# Patient Record
Sex: FEMALE | Race: WHITE | Employment: FULL TIME | URBAN - METROPOLITAN AREA
[De-identification: names, ages, dates, MRNs, and addresses within clinical notes are randomized per-mention and may not be internally consistent; named-entity substitution may affect disease eponyms.]

---

## 2017-02-14 ENCOUNTER — OFFICE VISIT (OUTPATIENT)
Dept: FAMILY MEDICINE CLINIC | Facility: CLINIC | Age: 62
End: 2017-02-14

## 2017-02-14 VITALS
TEMPERATURE: 98 F | HEART RATE: 97 BPM | DIASTOLIC BLOOD PRESSURE: 70 MMHG | OXYGEN SATURATION: 98 % | SYSTOLIC BLOOD PRESSURE: 120 MMHG | RESPIRATION RATE: 16 BRPM

## 2017-02-14 DIAGNOSIS — H10.9 CONJUNCTIVITIS OF BOTH EYES, UNSPECIFIED CONJUNCTIVITIS TYPE: Primary | ICD-10-CM

## 2017-02-14 PROCEDURE — 99213 OFFICE O/P EST LOW 20 MIN: CPT | Performed by: PHYSICIAN ASSISTANT

## 2017-02-14 RX ORDER — TOBRAMYCIN 3 MG/ML
SOLUTION/ DROPS OPHTHALMIC
Qty: 5 ML | Refills: 0 | Status: SHIPPED | OUTPATIENT
Start: 2017-02-14 | End: 2017-03-30 | Stop reason: ALTCHOICE

## 2017-02-14 NOTE — PROGRESS NOTES
CHIEF COMPLAINT:   Patient presents with:  Conjunctivitis      HPI:   Chidi López is a 64year old female who presents with chief complaint of  Bilateral eye redness.   She has had similar symptoms before and states antibiotic drops resolved the prob History   Diagnosis Date   • Esophageal reflux    • Fibromyalgia    • Osteoarthrosis, unspecified whether generalized or localized, unspecified site    • Connective tissue disease, undifferentiated ([facility]    • History of peptic ulcer    • Fatigue 8/11/2011 Temp(Src) 98 °F (36.7 °C) (Oral)  Resp 16  SpO2 98%  GENERAL: well developed, well nourished,in no apparent distress  SKIN: no rashes,no suspicious lesions  EYES: PERRLA, EOMI, bilateral conjunctiva  milldy erythematous, injected, no gross discharge noted

## 2017-04-06 NOTE — H&P
[facility]    PATIENT'S NAME: Billy Brown   ATTENDING PHYSICIAN: Thu Ashley M.D.    PATIENT ACCOUNT#:   [de-identified]    LOCATION:  OR   [facility]  MEDICAL RECORD #:   [id number]       YOB: 1955  ADMISSION DATE:       04/11/2017 She notes that she occasionally experiences some anxiety and depression. History of GERD in 2006. History of irritable bowel syndrome approximately 2006. Scoliosis diagnosed in 1974. Shingles diagnosed 04/05/2017.     GYNECOLOGIC HISTORY:  Menarche age she understands the procedure and risks of the procedure and consents to the above. Preoperative labs done. Postoperative care discussed. Cytotec discussed and prescription given.   Patient placed on acyclovir for her shingles and referred to Dermatology

## 2017-04-11 ENCOUNTER — SURGERY (OUTPATIENT)
Age: 62
End: 2017-04-11

## 2017-04-11 ENCOUNTER — ANESTHESIA (OUTPATIENT)
Dept: SURGERY | Facility: HOSPITAL | Age: 62
End: 2017-04-11
Payer: COMMERCIAL

## 2017-04-11 ENCOUNTER — ANESTHESIA EVENT (OUTPATIENT)
Dept: SURGERY | Facility: HOSPITAL | Age: 62
End: 2017-04-11
Payer: COMMERCIAL

## 2017-04-11 ENCOUNTER — HOSPITAL ENCOUNTER (OUTPATIENT)
Facility: HOSPITAL | Age: 62
Setting detail: HOSPITAL OUTPATIENT SURGERY
Discharge: HOME | End: 2017-04-11
Attending: OBSTETRICS & GYNECOLOGY | Admitting: OBSTETRICS & GYNECOLOGY
Payer: COMMERCIAL

## 2017-04-11 VITALS
TEMPERATURE: 99 F | OXYGEN SATURATION: 99 % | SYSTOLIC BLOOD PRESSURE: 111 MMHG | WEIGHT: 178.81 LBS | BODY MASS INDEX: 28.74 KG/M2 | DIASTOLIC BLOOD PRESSURE: 73 MMHG | HEART RATE: 69 BPM | RESPIRATION RATE: 20 BRPM | HEIGHT: 66 IN

## 2017-04-11 PROCEDURE — 0UDB8ZX EXTRACTION OF ENDOMETRIUM, VIA NATURAL OR ARTIFICIAL OPENING ENDOSCOPIC, DIAGNOSTIC: ICD-10-PCS | Performed by: OBSTETRICS & GYNECOLOGY

## 2017-04-11 PROCEDURE — 88305 TISSUE EXAM BY PATHOLOGIST: CPT | Performed by: OBSTETRICS & GYNECOLOGY

## 2017-04-11 RX ORDER — ACYCLOVIR 200 MG/1
200 CAPSULE ORAL
COMMUNITY
End: 2017-04-28 | Stop reason: ALTCHOICE

## 2017-04-11 RX ORDER — MIDAZOLAM HYDROCHLORIDE 1 MG/ML
1 INJECTION INTRAMUSCULAR; INTRAVENOUS EVERY 5 MIN PRN
Status: DISCONTINUED | OUTPATIENT
Start: 2017-04-11 | End: 2017-04-11

## 2017-04-11 RX ORDER — METOCLOPRAMIDE HYDROCHLORIDE 5 MG/ML
10 INJECTION INTRAMUSCULAR; INTRAVENOUS AS NEEDED
Status: DISCONTINUED | OUTPATIENT
Start: 2017-04-11 | End: 2017-04-11

## 2017-04-11 RX ORDER — SODIUM CHLORIDE, SODIUM LACTATE, POTASSIUM CHLORIDE, CALCIUM CHLORIDE 600; 310; 30; 20 MG/100ML; MG/100ML; MG/100ML; MG/100ML
INJECTION, SOLUTION INTRAVENOUS CONTINUOUS
Status: DISCONTINUED | OUTPATIENT
Start: 2017-04-11 | End: 2017-04-11

## 2017-04-11 RX ORDER — CLINDAMYCIN PHOSPHATE 900 MG/50ML
900 INJECTION INTRAVENOUS ONCE
Status: DISCONTINUED | OUTPATIENT
Start: 2017-04-11 | End: 2017-04-11 | Stop reason: HOSPADM

## 2017-04-11 RX ORDER — HYDROMORPHONE HYDROCHLORIDE 1 MG/ML
0.4 INJECTION, SOLUTION INTRAMUSCULAR; INTRAVENOUS; SUBCUTANEOUS EVERY 5 MIN PRN
Status: DISCONTINUED | OUTPATIENT
Start: 2017-04-11 | End: 2017-04-11

## 2017-04-11 RX ORDER — ONDANSETRON 2 MG/ML
4 INJECTION INTRAMUSCULAR; INTRAVENOUS AS NEEDED
Status: DISCONTINUED | OUTPATIENT
Start: 2017-04-11 | End: 2017-04-11

## 2017-04-11 RX ORDER — MEPERIDINE HYDROCHLORIDE 25 MG/ML
12.5 INJECTION INTRAMUSCULAR; INTRAVENOUS; SUBCUTANEOUS AS NEEDED
Status: DISCONTINUED | OUTPATIENT
Start: 2017-04-11 | End: 2017-04-11

## 2017-04-11 RX ORDER — NALOXONE HYDROCHLORIDE 0.4 MG/ML
80 INJECTION, SOLUTION INTRAMUSCULAR; INTRAVENOUS; SUBCUTANEOUS AS NEEDED
Status: DISCONTINUED | OUTPATIENT
Start: 2017-04-11 | End: 2017-04-11

## 2017-04-11 RX ORDER — CLINDAMYCIN PHOSPHATE 900 MG/50ML
INJECTION INTRAVENOUS
Status: DISCONTINUED | OUTPATIENT
Start: 2017-04-11 | End: 2017-04-11

## 2017-04-11 RX ORDER — ACETAMINOPHEN 500 MG
1000 TABLET ORAL ONCE AS NEEDED
Status: DISCONTINUED | OUTPATIENT
Start: 2017-04-11 | End: 2017-04-11

## 2017-04-11 RX ORDER — LIDOCAINE HYDROCHLORIDE 10 MG/ML
INJECTION, SOLUTION INFILTRATION; PERINEURAL AS NEEDED
Status: DISCONTINUED | OUTPATIENT
Start: 2017-04-11 | End: 2017-04-11

## 2017-04-11 RX ORDER — GENTAMICIN SULFATE 60 MG/50ML
INJECTION, SOLUTION INTRAVENOUS
Status: DISCONTINUED | OUTPATIENT
Start: 2017-04-11 | End: 2017-04-11

## 2017-04-11 RX ORDER — HYDROCODONE BITARTRATE AND ACETAMINOPHEN 10; 325 MG/1; MG/1
1 TABLET ORAL AS NEEDED
Status: DISCONTINUED | OUTPATIENT
Start: 2017-04-11 | End: 2017-04-11

## 2017-04-11 RX ORDER — HYDROCODONE BITARTRATE AND ACETAMINOPHEN 10; 325 MG/1; MG/1
2 TABLET ORAL AS NEEDED
Status: DISCONTINUED | OUTPATIENT
Start: 2017-04-11 | End: 2017-04-11

## 2017-04-11 NOTE — ANESTHESIA POSTPROCEDURE EVALUATION
[facility] Patient Status:  Hospital Outpatient Surgery   Age/Gender 64year old female MRN [id number]   [facility] SURGERY Attending Maia Andrews MD   Hosp Day # 0 PCP Queenie Roque MD       Anesthesia Post-op N

## 2017-04-11 NOTE — BRIEF OP NOTE
[facility]  Post-Op Procedure Note    Luciano Chelsea Patient Status:  Hospital Outpatient Surgery    1955 MRN [id number]   [facility] SURGERY Attending Natalya Cline MD   Hosp Day # 0 PCP Ambar Martinez MD     Preoperative D

## 2017-04-11 NOTE — OPERATIVE REPORT
[facility]    PATIENT'S NAME: Queenie Villarreal   ATTENDING PHYSICIAN: Stephen Rogel M.D. OPERATING PHYSICIAN: Stephen Rogel M.D.    PATIENT ACCOUNT#:   [de-identified]    LOCATION:  [address] [place] [address] 10  MEDICAL RECORD #:   [id number] patient was awoken in same-day surgery, was doing well and tolerated the procedure well. Input for the procedure was 650 mL of IV fluid. Urine output 225 mL. Estimated blood loss 5 mL. Hysteroscopic deficit 130 mL. Counts correct x2.   The patient

## 2017-04-11 NOTE — ANESTHESIA PREPROCEDURE EVALUATION
PRE-OP EVALUATION    Patient Name: Collin Chandra    Pre-op Diagnosis: POST MENOPAUSAL BLEEDING    Procedure(s): HYSTEROSCOPY, DILATION AND CURRETAGE     Surgeon(s) and Role:     Charlotte Hdz MD - Primary    Pre-op vitals reviewed.   Temp: 98.2 from herpes zoster outbreak on right breast.  Taking acyclovir and lesions are crusted over.                                       Past Surgical History    TUBAL LIGATION      HAND/FINGER SURGERY UNLISTED      Comment hand fracture    TONSILLECTOMY      OTH

## 2017-04-11 NOTE — H&P
Pre-op Diagnosis: POST MENOPAUSAL BLEEDING    The above referenced H&P was reviewed by Josiane Paz MD on 4/11/2017, the patient was examined and no significant changes have occurred in the patient's condition since the H&P was performed.   I discussed

## 2017-05-25 NOTE — H&P
[facility]    PATIENT'S NAME: Oriana Mann   ATTENDING PHYSICIAN: Eleni Bosworth, M.D.    PATIENT ACCOUNT#:   [de-identified]    LOCATION:  [facility]  MEDICAL RECORD #:   [id number]       YOB: 1955  ADMISSION DATE:       06/06/2017 of GERD. History of anxiety and depression, no present medications. History of irritable bowel syndrome, 2006. In June 1974, diagnosed with scoliosis.     PAST SURGICAL HISTORY:  In 2017, hysteroscopy with Gaxiola and Nephilan Truclear fractional D and C, be patient. The patient states she understands the procedure and risks and consents to the above. Alternative forms of treatment and observation were discussed, and the patient declines. Consent is obtained. Preop labs done. Postop care discussed.   Bowel

## 2017-06-06 ENCOUNTER — HOSPITAL ENCOUNTER (OUTPATIENT)
Facility: HOSPITAL | Age: 62
Setting detail: OBSERVATION
Discharge: HOME | End: 2017-06-07
Attending: OBSTETRICS & GYNECOLOGY | Admitting: OBSTETRICS & GYNECOLOGY
Payer: COMMERCIAL

## 2017-06-06 ENCOUNTER — ANESTHESIA (OUTPATIENT)
Dept: SURGERY | Facility: HOSPITAL | Age: 62
End: 2017-06-06

## 2017-06-06 ENCOUNTER — ANESTHESIA EVENT (OUTPATIENT)
Dept: SURGERY | Facility: HOSPITAL | Age: 62
End: 2017-06-06

## 2017-06-06 ENCOUNTER — SURGERY (OUTPATIENT)
Age: 62
End: 2017-06-06

## 2017-06-06 PROBLEM — N95.0 POSTMENOPAUSAL BLEEDING: Status: ACTIVE | Noted: 2017-06-06

## 2017-06-06 PROCEDURE — 88307 TISSUE EXAM BY PATHOLOGIST: CPT | Performed by: OBSTETRICS & GYNECOLOGY

## 2017-06-06 PROCEDURE — 0UTC4ZZ RESECTION OF CERVIX, PERCUTANEOUS ENDOSCOPIC APPROACH: ICD-10-PCS | Performed by: OBSTETRICS & GYNECOLOGY

## 2017-06-06 PROCEDURE — 0UT74ZZ RESECTION OF BILATERAL FALLOPIAN TUBES, PERCUTANEOUS ENDOSCOPIC APPROACH: ICD-10-PCS | Performed by: OBSTETRICS & GYNECOLOGY

## 2017-06-06 PROCEDURE — 8E0W4CZ ROBOTIC ASSISTED PROCEDURE OF TRUNK REGION, PERCUTANEOUS ENDOSCOPIC APPROACH: ICD-10-PCS | Performed by: OBSTETRICS & GYNECOLOGY

## 2017-06-06 PROCEDURE — 0TJB8ZZ INSPECTION OF BLADDER, VIA NATURAL OR ARTIFICIAL OPENING ENDOSCOPIC: ICD-10-PCS | Performed by: OBSTETRICS & GYNECOLOGY

## 2017-06-06 PROCEDURE — 0UT94ZZ RESECTION OF UTERUS, PERCUTANEOUS ENDOSCOPIC APPROACH: ICD-10-PCS | Performed by: OBSTETRICS & GYNECOLOGY

## 2017-06-06 PROCEDURE — 0UT24ZZ RESECTION OF BILATERAL OVARIES, PERCUTANEOUS ENDOSCOPIC APPROACH: ICD-10-PCS | Performed by: OBSTETRICS & GYNECOLOGY

## 2017-06-06 RX ORDER — ACETAMINOPHEN 10 MG/ML
1000 INJECTION, SOLUTION INTRAVENOUS EVERY 6 HOURS
Status: DISPENSED | OUTPATIENT
Start: 2017-06-06 | End: 2017-06-07

## 2017-06-06 RX ORDER — MEPERIDINE HYDROCHLORIDE 25 MG/ML
12.5 INJECTION INTRAMUSCULAR; INTRAVENOUS; SUBCUTANEOUS AS NEEDED
Status: DISCONTINUED | OUTPATIENT
Start: 2017-06-06 | End: 2017-06-06 | Stop reason: HOSPADM

## 2017-06-06 RX ORDER — IBUPROFEN 200 MG
200 TABLET ORAL EVERY 4 HOURS PRN
Status: DISCONTINUED | OUTPATIENT
Start: 2017-06-06 | End: 2017-06-07

## 2017-06-06 RX ORDER — HEPARIN SODIUM 5000 [USP'U]/ML
INJECTION, SOLUTION INTRAVENOUS; SUBCUTANEOUS
Status: COMPLETED
Start: 2017-06-06 | End: 2017-06-06

## 2017-06-06 RX ORDER — LEVOTHYROXINE SODIUM 88 UG/1
88 TABLET ORAL
Status: DISCONTINUED | OUTPATIENT
Start: 2017-06-07 | End: 2017-06-07

## 2017-06-06 RX ORDER — ONDANSETRON 2 MG/ML
4 INJECTION INTRAMUSCULAR; INTRAVENOUS AS NEEDED
Status: DISCONTINUED | OUTPATIENT
Start: 2017-06-06 | End: 2017-06-06 | Stop reason: HOSPADM

## 2017-06-06 RX ORDER — HYDROMORPHONE HYDROCHLORIDE 1 MG/ML
0.8 INJECTION, SOLUTION INTRAMUSCULAR; INTRAVENOUS; SUBCUTANEOUS EVERY 2 HOUR PRN
Status: DISCONTINUED | OUTPATIENT
Start: 2017-06-06 | End: 2017-06-07

## 2017-06-06 RX ORDER — CLINDAMYCIN PHOSPHATE 900 MG/50ML
900 INJECTION INTRAVENOUS ONCE
Status: DISCONTINUED | OUTPATIENT
Start: 2017-06-06 | End: 2017-06-06 | Stop reason: HOSPADM

## 2017-06-06 RX ORDER — IBUPROFEN 400 MG/1
400 TABLET ORAL EVERY 4 HOURS PRN
Status: DISCONTINUED | OUTPATIENT
Start: 2017-06-06 | End: 2017-06-07

## 2017-06-06 RX ORDER — ONDANSETRON 2 MG/ML
4 INJECTION INTRAMUSCULAR; INTRAVENOUS EVERY 8 HOURS PRN
Status: DISCONTINUED | OUTPATIENT
Start: 2017-06-06 | End: 2017-06-07

## 2017-06-06 RX ORDER — CLINDAMYCIN PHOSPHATE 900 MG/50ML
INJECTION INTRAVENOUS
Status: DISPENSED
Start: 2017-06-06 | End: 2017-06-06

## 2017-06-06 RX ORDER — SODIUM CHLORIDE, SODIUM LACTATE, POTASSIUM CHLORIDE, CALCIUM CHLORIDE 600; 310; 30; 20 MG/100ML; MG/100ML; MG/100ML; MG/100ML
INJECTION, SOLUTION INTRAVENOUS CONTINUOUS
Status: DISCONTINUED | OUTPATIENT
Start: 2017-06-06 | End: 2017-06-07

## 2017-06-06 RX ORDER — IBUPROFEN 600 MG/1
600 TABLET ORAL EVERY 6 HOURS PRN
Status: DISCONTINUED | OUTPATIENT
Start: 2017-06-06 | End: 2017-06-07

## 2017-06-06 RX ORDER — HYDROMORPHONE HYDROCHLORIDE 1 MG/ML
0.4 INJECTION, SOLUTION INTRAMUSCULAR; INTRAVENOUS; SUBCUTANEOUS EVERY 5 MIN PRN
Status: DISCONTINUED | OUTPATIENT
Start: 2017-06-06 | End: 2017-06-06 | Stop reason: HOSPADM

## 2017-06-06 RX ORDER — HEPARIN SODIUM 5000 [USP'U]/ML
5000 INJECTION, SOLUTION INTRAVENOUS; SUBCUTANEOUS ONCE
Status: COMPLETED | OUTPATIENT
Start: 2017-06-06 | End: 2017-06-06

## 2017-06-06 RX ORDER — ONDANSETRON 4 MG/1
4 TABLET, FILM COATED ORAL EVERY 8 HOURS PRN
Status: DISCONTINUED | OUTPATIENT
Start: 2017-06-06 | End: 2017-06-07

## 2017-06-06 RX ORDER — DIPHENHYDRAMINE HYDROCHLORIDE 50 MG/ML
12.5 INJECTION INTRAMUSCULAR; INTRAVENOUS AS NEEDED
Status: DISCONTINUED | OUTPATIENT
Start: 2017-06-06 | End: 2017-06-06 | Stop reason: HOSPADM

## 2017-06-06 RX ORDER — DEXAMETHASONE SODIUM PHOSPHATE 4 MG/ML
4 VIAL (ML) INJECTION AS NEEDED
Status: DISCONTINUED | OUTPATIENT
Start: 2017-06-06 | End: 2017-06-06 | Stop reason: HOSPADM

## 2017-06-06 RX ORDER — BUPIVACAINE HYDROCHLORIDE AND EPINEPHRINE 5; 5 MG/ML; UG/ML
INJECTION, SOLUTION EPIDURAL; INTRACAUDAL; PERINEURAL AS NEEDED
Status: DISCONTINUED | OUTPATIENT
Start: 2017-06-06 | End: 2017-06-06 | Stop reason: HOSPADM

## 2017-06-06 RX ORDER — HYDROMORPHONE HYDROCHLORIDE 1 MG/ML
INJECTION, SOLUTION INTRAMUSCULAR; INTRAVENOUS; SUBCUTANEOUS
Status: COMPLETED
Start: 2017-06-06 | End: 2017-06-06

## 2017-06-06 RX ORDER — NALOXONE HYDROCHLORIDE 0.4 MG/ML
80 INJECTION, SOLUTION INTRAMUSCULAR; INTRAVENOUS; SUBCUTANEOUS AS NEEDED
Status: DISCONTINUED | OUTPATIENT
Start: 2017-06-06 | End: 2017-06-06 | Stop reason: HOSPADM

## 2017-06-06 RX ORDER — HYDROMORPHONE HYDROCHLORIDE 1 MG/ML
0.4 INJECTION, SOLUTION INTRAMUSCULAR; INTRAVENOUS; SUBCUTANEOUS EVERY 2 HOUR PRN
Status: DISCONTINUED | OUTPATIENT
Start: 2017-06-06 | End: 2017-06-07

## 2017-06-06 RX ORDER — DEXTROSE MONOHYDRATE, SODIUM CHLORIDE, SODIUM LACTATE, POTASSIUM CHLORIDE, CALCIUM CHLORIDE 5; 600; 310; 179; 20 G/100ML; MG/100ML; MG/100ML; MG/100ML; MG/100ML
INJECTION, SOLUTION INTRAVENOUS CONTINUOUS
Status: DISCONTINUED | OUTPATIENT
Start: 2017-06-06 | End: 2017-06-07

## 2017-06-06 RX ORDER — HYDROMORPHONE HYDROCHLORIDE 1 MG/ML
1 INJECTION, SOLUTION INTRAMUSCULAR; INTRAVENOUS; SUBCUTANEOUS EVERY 2 HOUR PRN
Status: DISCONTINUED | OUTPATIENT
Start: 2017-06-06 | End: 2017-06-07

## 2017-06-06 RX ORDER — ZOLPIDEM TARTRATE 5 MG/1
5 TABLET ORAL NIGHTLY PRN
Status: DISCONTINUED | OUTPATIENT
Start: 2017-06-06 | End: 2017-06-07

## 2017-06-06 RX ORDER — MIDAZOLAM HYDROCHLORIDE 1 MG/ML
1 INJECTION INTRAMUSCULAR; INTRAVENOUS EVERY 5 MIN PRN
Status: DISCONTINUED | OUTPATIENT
Start: 2017-06-06 | End: 2017-06-06 | Stop reason: HOSPADM

## 2017-06-06 NOTE — OPERATIVE REPORT
[facility]    PATIENT'S NAME: Ingrid    ATTENDING PHYSICIAN: Milan Juan M.D. OPERATING PHYSICIAN: Milan Juan M.D.    PATIENT ACCOUNT#:   [de-identified]    LOCATION:  [facility]  MEDICAL RECORD #:   [id number]       DATE OF removed from the cervix. The speculum was removed from the vagina. The coefficient was then placed appropriately on the cervix and locked into the appropriate notches on the ANAMARIA manipulator. The cervix was palpated to ensure adequate placement.   A pelv and this was lysed with sharp dissection with the monopolar and PK. The tissue in the pelvis was thick, and the ureters were not well visualized. The left round ligament was placed on a stretch with a prograsper.   The round ligament was coagulated and in dioxide gas pressure was decreased to 6, and the abdominopelvic cavity was again explored, and adequate hemostasis was noted. The [employer] robot was then undocked. The needle was removed.   The 12 mm trocar was removed, and the 12 mm trocar site was reapp

## 2017-06-06 NOTE — H&P
Pre-op Diagnosis: POSTMENOPAUSAL BLEEDING    The above referenced H&P was reviewed by Nery Crane MD on 6/6/2017, the patient was examined and no significant changes have occurred in the patient's condition since the H&P was performed.   I discussed wi

## 2017-06-06 NOTE — BRIEF OP NOTE
[facility]  Post-Op Procedure Note    Ida Net Patient Status:  Outpatient in a Bed    1955 MRN [id number]   [facility] SURGERY Attending Carlie Tineo,  [facility]  Day # 0 PCP Felipe Ulloa MD     Preoperative Diagnosis

## 2017-06-06 NOTE — PLAN OF CARE
NURSING ADMISSION NOTE      Patient admitted via bed. Oriented to room. Safety precautions initiated. Bed in low position. Call light in reach.   Pt adm from PACU s/p saima sandoval.

## 2017-06-06 NOTE — ANESTHESIA POSTPROCEDURE EVALUATION
10 [facility] Patient Status:  Outpatient in a Bed   Age/Gender 64year old female MRN [id number]   [facility] SURGERY Attending Shaan Meneses, [address] [facility] [address] Day # 0 PCP Laura David MD       Anesthesia Post-op Note    P

## 2017-06-06 NOTE — ANESTHESIA PREPROCEDURE EVALUATION
PRE-OP EVALUATION    Patient Name: Ivan Calixto    Pre-op Diagnosis: POSTMENOPAUSAL BLEEDING    Procedure(s):  DAVINCI ROBOT TOTAL HYSTERECTOMY WITH BILATERAL SALPINGO-OOPHORECTOMY    Surgeon(s) and Role:     Tyrese Garcia MD - Primary    Pre-o hypothyroidism                       Pulmonary                           Neuro/Psych                                        Past Surgical History    TUBAL LIGATION      HAND/FINGER SURGERY UNLISTED      Comment hand fracture    TONSILLECTOMY      OTHER  19

## 2017-06-07 VITALS
RESPIRATION RATE: 18 BRPM | WEIGHT: 173.06 LBS | OXYGEN SATURATION: 99 % | BODY MASS INDEX: 27.16 KG/M2 | HEIGHT: 67 IN | SYSTOLIC BLOOD PRESSURE: 124 MMHG | HEART RATE: 73 BPM | TEMPERATURE: 99 F | DIASTOLIC BLOOD PRESSURE: 78 MMHG

## 2017-06-07 PROCEDURE — 85025 COMPLETE CBC W/AUTO DIFF WBC: CPT | Performed by: OBSTETRICS & GYNECOLOGY

## 2017-06-07 PROCEDURE — 80048 BASIC METABOLIC PNL TOTAL CA: CPT | Performed by: OBSTETRICS & GYNECOLOGY

## 2017-06-07 NOTE — PROGRESS NOTES
Doing well. Pain relieved wityh pain meds. Urinating. Ambulating.      06/07/17  0813   BP: 124/78   Pulse: 73   Temp: 98.6 °F (37 °C)   Resp: 18       Lab Results  Component Value Date   WBC 10.2 06/07/2017   HGB 13.2 06/07/2017   HCT 39.0 06/07/2017   PLT

## 2017-06-07 NOTE — PAYOR COMM NOTE
--------------  ADMISSION REVIEW     Payor: [facility] PPO  Authorization Number: [id number]/A    Admit date: 6/6/2017  5:43 AM       Admitting Physician: Clair Gomes MD  Attending Physician:  No att. providers found  Primary Care Physician: Priti Au MD Oral Melly Iverson RN      dextrose 5%/lactated ringers & KCl 20mEq infusion     Date Action Dose Route User    Discharged on 6/7/2017 6/6/2017 1511 New Bag 125 mL/hr Intravenous Melly Iverson RN      Levothyroxine Sodium (SYNTHROID, LEVOTHROID) tab 8

## 2017-06-07 NOTE — PLAN OF CARE
Minimal or absence of nausea and vomiting Adequate for Discharge      Maintains or returns to baseline bowel function Adequate for Discharge      Absence of urinary retention Adequate for Discharge      Verbalizes/displays adequate comfort level or patient

## 2017-06-08 PROCEDURE — 86800 THYROGLOBULIN ANTIBODY: CPT | Performed by: INTERNAL MEDICINE

## 2017-06-08 PROCEDURE — 84432 ASSAY OF THYROGLOBULIN: CPT | Performed by: INTERNAL MEDICINE

## 2017-06-08 PROCEDURE — 88184 FLOWCYTOMETRY/ TC 1 MARKER: CPT | Performed by: INTERNAL MEDICINE

## 2017-06-26 ENCOUNTER — HOSPITAL ENCOUNTER (OUTPATIENT)
Dept: MAMMOGRAPHY | Age: 62
Discharge: HOME | End: 2017-06-26
Attending: OBSTETRICS & GYNECOLOGY
Payer: COMMERCIAL

## 2017-06-26 DIAGNOSIS — Z12.31 SCREENING MAMMOGRAM, ENCOUNTER FOR: ICD-10-CM

## 2017-06-26 PROCEDURE — 77063 BREAST TOMOSYNTHESIS BI: CPT | Performed by: OBSTETRICS & GYNECOLOGY

## 2017-06-26 PROCEDURE — 77067 SCR MAMMO BI INCL CAD: CPT | Performed by: OBSTETRICS & GYNECOLOGY

## 2017-12-01 ENCOUNTER — OFFICE VISIT (OUTPATIENT)
Dept: FAMILY MEDICINE CLINIC | Facility: CLINIC | Age: 62
End: 2017-12-01

## 2017-12-01 VITALS
TEMPERATURE: 98 F | WEIGHT: 181 LBS | OXYGEN SATURATION: 99 % | DIASTOLIC BLOOD PRESSURE: 70 MMHG | HEART RATE: 87 BPM | SYSTOLIC BLOOD PRESSURE: 120 MMHG | HEIGHT: 67 IN | BODY MASS INDEX: 28.41 KG/M2

## 2017-12-01 DIAGNOSIS — J01.40 ACUTE PANSINUSITIS, RECURRENCE NOT SPECIFIED: Primary | ICD-10-CM

## 2017-12-01 PROCEDURE — 99213 OFFICE O/P EST LOW 20 MIN: CPT | Performed by: PHYSICIAN ASSISTANT

## 2017-12-01 RX ORDER — DOXYCYCLINE HYCLATE 100 MG/1
100 CAPSULE ORAL 2 TIMES DAILY
Qty: 20 CAPSULE | Refills: 0 | Status: SHIPPED | OUTPATIENT
Start: 2017-12-01 | End: 2017-12-11

## 2017-12-01 NOTE — PROGRESS NOTES
CHIEF COMPLAINT:   Patient presents with:  Nasal Congestion      HPI:   Shaka Lala is a 58year old female who presents for cold symptoms for  2  weeks. Symptoms have progressed into sinus congestion and have been worsening since onset.   The patien • History of peptic ulcer    • IBS (irritable bowel syndrome)    • Osteoarthrosis, unspecified whether generalized or localized, unspecified site    • Rheumatoid arthritis(714.0)    • Seasonal allergies    • Sinus infection    • Unspecified diffuse connect HEAD: atraumatic, normocephalic, +tenderness on palpation of the maxillary and frontal sinuses  EYES: PERRL. EOMI. Conjunctiva normal.  Cornea clear.   Lid margins normal.   EARS: Left TM normal, no bulging, no retraction, no fluid, bony landmarks normal. The sinuses are air-filled spaces within the bones of the face. They connect to the inside of the nose. Sinusitis is an inflammation of the tissue lining the sinus cavity. Sinus inflammation can occur during a cold.  It can also be due to allergies to polle · Do not use nasal rinses or irrigation during an acute sinus infection, unless told to by your health care provider. Rinsing may spread the infection to other sinuses.   · Use acetaminophen or ibuprofen to control pain, unless another pain medicine was pre

## 2017-12-01 NOTE — PATIENT INSTRUCTIONS
1. Doxycycline  2. Flonase  3. Follow up with PCP  Sinusitis (Antibiotic Treatment)    The sinuses are air-filled spaces within the bones of the face.  They connect to the inside of the nose. Sinusitis is an inflammation of the tissue lining the sinus cav · Do not use nasal rinses or irrigation during an acute sinus infection, unless told to by your health care provider. Rinsing may spread the infection to other sinuses.   · Use acetaminophen or ibuprofen to control pain, unless another pain medicine was pre

## 2017-12-15 PROCEDURE — 86800 THYROGLOBULIN ANTIBODY: CPT | Performed by: OTOLARYNGOLOGY

## 2017-12-15 PROCEDURE — 84432 ASSAY OF THYROGLOBULIN: CPT | Performed by: OTOLARYNGOLOGY

## 2017-12-15 PROCEDURE — 84432 ASSAY OF THYROGLOBULIN: CPT | Performed by: INTERNAL MEDICINE

## 2017-12-15 PROCEDURE — 86800 THYROGLOBULIN ANTIBODY: CPT | Performed by: INTERNAL MEDICINE

## 2018-01-27 ENCOUNTER — OFFICE VISIT (OUTPATIENT)
Dept: FAMILY MEDICINE CLINIC | Facility: CLINIC | Age: 63
End: 2018-01-27

## 2018-01-27 DIAGNOSIS — J01.00 ACUTE NON-RECURRENT MAXILLARY SINUSITIS: Primary | ICD-10-CM

## 2018-01-27 PROCEDURE — 99213 OFFICE O/P EST LOW 20 MIN: CPT | Performed by: NURSE PRACTITIONER

## 2018-01-27 RX ORDER — DOXYCYCLINE HYCLATE 100 MG
100 TABLET ORAL 2 TIMES DAILY
Qty: 14 TABLET | Refills: 0 | Status: SHIPPED | OUTPATIENT
Start: 2018-01-27 | End: 2018-02-03

## 2018-01-27 RX ORDER — INFLUENZA A VIRUS A/CHRISTCHURCH/16/2010 NIB-74 (H1N1) HEMAGGLUTININ ANTIGEN (PROPIOLACTONE INACTIVATED), INFLUENZA A VIRUS A/SWITZERLAND/9715293/2013, NIB-88 (H3N2) HEMAGGLUTININ ANTIGEN (PROPIOLACTONE INACTIVATED), INFLUENZA B VIRUS B/PHUKET/3073/2013 - WILD TYPE HEMAGGLUTININ ANTIGEN (PROPIOLACTONE INACTIVATED) 15; 15; 15 UG/.5ML; UG/.5ML; UG/.5ML
INJECTION, SUSPENSION INTRAMUSCULAR
Refills: 0 | COMMUNITY
Start: 2017-11-17

## 2018-01-27 NOTE — PATIENT INSTRUCTIONS
1. Rest. Drink plenty of fluids. 2. Doxycycline as prescribed. 3. Tylenol/Ibuprofen for pain/fevers. 4. Nasal rinses as directed. Use humidifier at home when possible.   5. Follow up with PMD in 3-4 days for reeval. Follow up sooner or go to the emerge Treatment is aimed at unblocking the sinus opening and helping the cilia work again. You may need to take antihistamine and decongestant medicine. These can reduce inflammation and decrease the amount of fluid your sinuses make.  If you have a bacterial inf

## 2018-01-27 NOTE — PROGRESS NOTES
CHIEF COMPLAINT:   Patient presents with:  Sinus Problem      HPI:   Machelle Smyth is a 58year old female who presents for cold symptoms for  2  weeks. Symptoms have progressed into sinus congestion and been worsening since onset.  Sinus congestion/pa No date: Dehydration  No date: Disorder of thyroid      Comment: multinodular thyroid  No date: Diverticulitis  No date: Esophageal reflux  8/11/2011: Fatigue  No date: Fibromyalgia  11/16/2011: GERD (gastroesophageal reflux disease)  No date: History of g NEURO: + sinus headaches. No numbness or tingling in face. EXAM:   There were no vitals taken for this visit.   GENERAL: well developed, well nourished,in no apparent distress  SKIN: no rashes,no suspicious lesions  HEAD: atraumatic, normocephalic, +  t 4. Nasal rinses as directed. Use humidifier at home when possible. 5. Follow up with PMD in 3-4 days for reeval. Follow up sooner or go to the emergency department immediately if symptoms worsen, change, or if you have any concerns.       Acute Sinusitis Treatment is aimed at unblocking the sinus opening and helping the cilia work again. You may need to take antihistamine and decongestant medicine. These can reduce inflammation and decrease the amount of fluid your sinuses make.  If you have a bacterial inf

## 2018-06-13 PROCEDURE — 86800 THYROGLOBULIN ANTIBODY: CPT | Performed by: INTERNAL MEDICINE

## 2018-06-13 PROCEDURE — 84432 ASSAY OF THYROGLOBULIN: CPT | Performed by: INTERNAL MEDICINE

## 2018-06-13 PROCEDURE — 36415 COLL VENOUS BLD VENIPUNCTURE: CPT | Performed by: INTERNAL MEDICINE

## (undated) DEVICE — SYRINGE 10ML LL CONTRL SYRINGE

## (undated) DEVICE — 2, DISPOSABLE SUCTION/IRRIGATOR WITHOUT DISPOSABLE TIP: Brand: STRYKEFLOW

## (undated) DEVICE — GLOVE SURG SENSICARE SZ 5-1/2

## (undated) DEVICE — HYSTEROSCOPIC INFLOW TUBE SET

## (undated) DEVICE — NEEDLE SPINAL 22X3-1/2 BLK

## (undated) DEVICE — DV KIT ACCESSORY 4-ARM

## (undated) DEVICE — SUTURE VICRYL 0

## (undated) DEVICE — SOL  .9 3000ML

## (undated) DEVICE — KENDALL SCD EXPRESS SLEEVES, KNEE LENGTH, MEDIUM: Brand: KENDALL SCD

## (undated) DEVICE — 1010 S-DRAPE TOWEL DRAPE 10/BX: Brand: STERI-DRAPE™

## (undated) DEVICE — 40580 - THE PINK PAD - ADVANCED TRENDELENBURG POSITIONING KIT: Brand: 40580 - THE PINK PAD - ADVANCED TRENDELENBURG POSITIONING KIT

## (undated) DEVICE — ENDOPATH XCEL WITH OPTIVIEW TECHNOLOGY BLADELESS TROCARS WITH STABILITY SLEEVES: Brand: ENDOPATH XCEL OPTIVIEW

## (undated) DEVICE — NON-ADHERENT PAD PREPACK: Brand: TELFA

## (undated) DEVICE — BIPOLAR FORCEPS CORD,BANANA LEADS: Brand: VALLEYLAB

## (undated) DEVICE — DV OBTURATOR BLADELESS 8MM

## (undated) DEVICE — DEV REMOVAL TRUCLEAR SFT MINI

## (undated) DEVICE — TIP COVER ACCESSORY

## (undated) DEVICE — SPECIMEN SOCK - STANDARD: Brand: MEDI-VAC

## (undated) DEVICE — SUTURE MONOCRYL 4-0 PS-2

## (undated) DEVICE — MEDI-VAC NON-CONDUCTIVE SUCTION TUBING: Brand: CARDINAL HEALTH

## (undated) DEVICE — ENDOPATH XCEL BLADELESS TROCARS WITH STABILITY SLEEVES: Brand: ENDOPATH XCEL

## (undated) DEVICE — MONOPOLAR CURVED SCISSORS: Brand: ENDOWRIST;DAVINCI SI

## (undated) DEVICE — GYN LAP/ROBOTIC: Brand: MEDLINE INDUSTRIES, INC.

## (undated) DEVICE — 2000CC GUARDIAN II: Brand: GUARDIAN

## (undated) DEVICE — ELECTRO LUBE IS A SINGLE PATIENT USE DEVICE THAT IS INTENDED TO BE USED ON ELECTROSURGICAL ELECTRODES TO REDUCE STICKING.: Brand: KEY SURGICAL ELECTRO LUBE

## (undated) DEVICE — SUTURE VLOC 180 2-0 12\" 0315

## (undated) DEVICE — SOL  .9 1000ML BTL

## (undated) DEVICE — OUTFLOW HYSTER S&N

## (undated) DEVICE — 3M(TM) TEGADERM(TM) TRANSPARENT FILM DRESSING FRAME STYLE 9505W: Brand: 3M™ TEGADERM™

## (undated) DEVICE — SOL H2O 1000ML BTL

## (undated) DEVICE — DELINEATOR UTER MANIP 3.5

## (undated) DEVICE — GYN CDS: Brand: MEDLINE INDUSTRIES, INC.

## (undated) DEVICE — SYRINGE 50ML LL TIP

## (undated) DEVICE — GLOVE SURG TRIUMPH SZ 6

## (undated) NOTE — IP AVS SNAPSHOT
[facility] [place] [address] [place], [address] ~ [phone]                Discharge Summary   6/6/2017    Mrs. Pelletier Begin           Admission Information        Provider Department    6/6/2017 Denny Pedroza MD  3nw-A Take 1 tablet by mouth daily. Patient Instructions       Please take motrin with food. Do not exceed recommended dose. Alternate tylenol and motrin to achieve adequate control.  If you need to take norco for pain manage Neutrophil % Lymphocyte % Monocyte % Eosinophil % Basophil % Prelim Neut Abs Final Neut Abs Lymphocyte Abso Monocyte Absolu Eosinophil Abso Basophil Absolu    (06/07/17)  65.7 (06/07/17)  22.9 (06/07/17)  10.5 (06/07/17)  0.3 (06/07/17)  0.2 -- (06/07/17) You can access your MyChart to more actively manage your health care and view more details from this visit by going to [email or web address].   If you've recently had a stay at the Hospital you can access your discharge instructions in [address] by ron changes, changes in sleep and alertness, palpitations   What to report to your healthcare team:  Changes in thinking, confusion, skin swelling, palpitations, dizziness           All Other Medications     Calcium Carbonate-Vitamin D 600-125 MG-UNIT Oral Tab

## (undated) NOTE — MR AVS SNAPSHOT
[facility] [place]  [address] [facility] [address]  [phone]               Thank you for choosing us for your health care visit with ALFRED Mahajan. We are glad to serve you and happy to provide you with this summary of your visit.   Ple · Increased redness or drainage from the eye  · Failure of normal vision to return within 24-48 hours. © 2674-1701 The 6 [facility], [address] [place] [address]. All rights reserved.  This information is not intended as a substitute Vitamin B-12 1000 MCG Tabs   Take 1,000 mcg by mouth daily. Commonly known as:  VITAMIN B12           Vitamin D3 1000 units Caps   Take 1 tablet by mouth daily. vitamin E 100 UNITS Caps   Take 100 Units by mouth daily.                 Where to Choose whole grain products Foods high in sodium   Water is best for hydration Fast food.    Eat at home when possible     Tips for increasing your physical activity – Adults who are physically active are less likely to develop some chronic diseases than ad

## (undated) NOTE — IP AVS SNAPSHOT
[facility] [place] [address] [place], [address] ~ [phone]                Discharge Summary   4/11/2017    Mrs. Amador Search           Admission Information        Provider Department    4/11/2017 Fay Munoz MD  Pre- DILATATION & CURETTAGE (D&C)  Dr Dylan King. 1. Take it easy for the first 24 hours. Stay at home if possible and make sure  someone is at home to help you or to report any symptoms or problems you  might have.     2. You may feel weak, dizzy or a little · Sometimes after surgery; you don't feel like eating normally  · If you don't drink water; you could get dehydrated    Alcoholic beverages should be avoided for:  · 24 hours after Anesthesia/Sedation    Call the doctor for:  · Elevated Temperature  · Blee 4/11/2017  5:20 AM  Pre-Op / [facility] [phone]         We want to hear from you       We want to hear from you, please share your experience with us by returning the survey you will receive in the mail. Thank you!         MyChart     Visit MyChart  You